# Patient Record
Sex: FEMALE | Race: OTHER | URBAN - METROPOLITAN AREA
[De-identification: names, ages, dates, MRNs, and addresses within clinical notes are randomized per-mention and may not be internally consistent; named-entity substitution may affect disease eponyms.]

---

## 2022-06-22 ENCOUNTER — OFFICE VISIT (OUTPATIENT)
Dept: URGENT CARE | Facility: CLINIC | Age: 57
End: 2022-06-22

## 2022-06-22 ENCOUNTER — HOSPITAL ENCOUNTER (OUTPATIENT)
Facility: MEDICAL CENTER | Age: 57
End: 2022-06-22
Attending: PHYSICIAN ASSISTANT

## 2022-06-22 VITALS
RESPIRATION RATE: 14 BRPM | SYSTOLIC BLOOD PRESSURE: 116 MMHG | HEIGHT: 67 IN | DIASTOLIC BLOOD PRESSURE: 84 MMHG | WEIGHT: 177 LBS | BODY MASS INDEX: 27.78 KG/M2 | OXYGEN SATURATION: 94 % | TEMPERATURE: 98.6 F | HEART RATE: 71 BPM

## 2022-06-22 DIAGNOSIS — N30.00 ACUTE CYSTITIS WITHOUT HEMATURIA: ICD-10-CM

## 2022-06-22 LAB
APPEARANCE UR: CLEAR
BILIRUB UR STRIP-MCNC: NEGATIVE MG/DL
COLOR UR AUTO: YELLOW
GLUCOSE UR STRIP.AUTO-MCNC: NEGATIVE MG/DL
KETONES UR STRIP.AUTO-MCNC: NEGATIVE MG/DL
LEUKOCYTE ESTERASE UR QL STRIP.AUTO: NORMAL
NITRITE UR QL STRIP.AUTO: NEGATIVE
PH UR STRIP.AUTO: 7 [PH] (ref 5–8)
PROT UR QL STRIP: NEGATIVE MG/DL
RBC UR QL AUTO: NORMAL
SP GR UR STRIP.AUTO: 1.01
UROBILINOGEN UR STRIP-MCNC: 2 MG/DL

## 2022-06-22 PROCEDURE — 99204 OFFICE O/P NEW MOD 45 MIN: CPT | Performed by: PHYSICIAN ASSISTANT

## 2022-06-22 PROCEDURE — 81002 URINALYSIS NONAUTO W/O SCOPE: CPT | Performed by: PHYSICIAN ASSISTANT

## 2022-06-22 PROCEDURE — 87086 URINE CULTURE/COLONY COUNT: CPT

## 2022-06-22 RX ORDER — WARFARIN SODIUM 5 MG/1
5 TABLET ORAL DAILY
COMMUNITY

## 2022-06-22 RX ORDER — NITROFURANTOIN 25; 75 MG/1; MG/1
100 CAPSULE ORAL 2 TIMES DAILY
Qty: 10 CAPSULE | Refills: 0 | Status: SHIPPED | OUTPATIENT
Start: 2022-06-22 | End: 2022-06-27

## 2022-06-22 RX ORDER — GABAPENTIN 300 MG/1
300 CAPSULE ORAL 3 TIMES DAILY
COMMUNITY

## 2022-06-22 RX ORDER — SERTRALINE HYDROCHLORIDE 100 MG/1
100 TABLET, FILM COATED ORAL DAILY
COMMUNITY

## 2022-06-22 RX ORDER — PHENAZOPYRIDINE HYDROCHLORIDE 200 MG/1
200 TABLET, FILM COATED ORAL 3 TIMES DAILY PRN
Qty: 6 TABLET | Refills: 0 | Status: SHIPPED | OUTPATIENT
Start: 2022-06-22

## 2022-06-23 DIAGNOSIS — N30.00 ACUTE CYSTITIS WITHOUT HEMATURIA: ICD-10-CM

## 2022-06-24 ASSESSMENT — ENCOUNTER SYMPTOMS
HEADACHES: 0
FLANK PAIN: 0
ABDOMINAL PAIN: 0
CHILLS: 0
FEVER: 0
VOMITING: 0
NAUSEA: 0
BACK PAIN: 1
COUGH: 0
SHORTNESS OF BREATH: 0
DIARRHEA: 0

## 2022-06-24 NOTE — PROGRESS NOTES
"Subjective     Katty oDss is a 57 y.o. female who presents with UTI (Started today, hurts in her back and side when she moves/)            Dysuria   This is a new problem. The current episode started today. The problem occurs every urination. The problem has been unchanged. The quality of the pain is described as burning. The pain is moderate. There has been no fever. Associated symptoms include frequency and urgency. Pertinent negatives include no chills, flank pain, hematuria, hesitancy, nausea or vomiting. Associated symptoms comments: Lower back pain . She has tried increased fluids for the symptoms. There is no history of recurrent UTIs.         No past medical history on file.    No past surgical history on file.    No family history on file.    No Known Allergies    Medications, Allergies, and current problem list reviewed today in Epic    Review of Systems   Constitutional: Negative for chills, fever and malaise/fatigue.   Respiratory: Negative for cough and shortness of breath.    Cardiovascular: Negative for chest pain and leg swelling.   Gastrointestinal: Negative for abdominal pain, diarrhea, nausea and vomiting.   Genitourinary: Positive for dysuria, frequency and urgency. Negative for flank pain, hematuria and hesitancy.   Musculoskeletal: Positive for back pain.   Neurological: Negative for headaches.     All other systems reviewed and are negative.              Objective     /84   Pulse 71   Temp 37 °C (98.6 °F) (Temporal)   Resp 14   Ht 1.702 m (5' 7\")   Wt 80.3 kg (177 lb)   SpO2 94%   BMI 27.72 kg/m²      Physical Exam  Constitutional:       General: She is not in acute distress.     Appearance: Normal appearance. She is not ill-appearing.   HENT:      Head: Normocephalic and atraumatic.   Eyes:      Conjunctiva/sclera: Conjunctivae normal.   Cardiovascular:      Rate and Rhythm: Normal rate and regular rhythm.   Pulmonary:      Effort: Pulmonary effort is normal. No respiratory " distress.      Breath sounds: No stridor. No wheezing.   Abdominal:      General: There is no distension.      Palpations: Abdomen is soft. There is no mass.      Tenderness: There is abdominal tenderness (mild suprapubic TTP ). There is no right CVA tenderness, left CVA tenderness, guarding or rebound.   Skin:     General: Skin is warm and dry.   Neurological:      General: No focal deficit present.      Mental Status: She is alert and oriented to person, place, and time.   Psychiatric:         Mood and Affect: Mood normal.         Behavior: Behavior normal.         Thought Content: Thought content normal.         Judgment: Judgment normal.                  Lab Results   Component Value Date/Time    POCCOLOR YELLOW 06/22/2022 06:23 PM    POCAPPEAR CLEAR 06/22/2022 06:23 PM    POCLEUKEST TRACE 06/22/2022 06:23 PM    POCNITRITE NEGATIVE 06/22/2022 06:23 PM    POCUROBILIGE 2.0 06/22/2022 06:23 PM    POCPROTEIN NEGATIVE 06/22/2022 06:23 PM    POCURPH 7.0 06/22/2022 06:23 PM    POCBLOOD TRACE INTACT 06/22/2022 06:23 PM    POCSPGRV 1.015 06/22/2022 06:23 PM    POCKETONES NEGATIVE 06/22/2022 06:23 PM    POCBILIRUBIN NEGATIVE 06/22/2022 06:23 PM    POCGLUCUA NEGATIVE 06/22/2022 06:23 PM                 Assessment & Plan        1. Acute cystitis without hematuria    - POCT Urinalysis  - URINE CULTURE(NEW); Future  - nitrofurantoin (MACROBID) 100 MG Cap; Take 1 Capsule by mouth 2 times a day for 5 days.  Dispense: 10 Capsule; Refill: 0  - phenazopyridine (PYRIDIUM) 200 MG Tab; Take 1 Tablet by mouth 3 times a day as needed for Moderate Pain.  Dispense: 6 Tablet; Refill: 0  Patient on Coumadin. Advised patient to watch for signs of bleeding due to co-comitant antibiotic use. Contact provider that monitors INR as she may warrant closer monitoring.    Differential diagnoses, Supportive care, and indications for immediate follow-up discussed with patient.   Pathogenesis of diagnosis discussed including typical length and natural  progression.   Instructed to return to clinic or nearest emergency department for any change in condition, further concerns, or worsening of symptoms.    The patient demonstrated a good understanding and agreed with the treatment plan.    Lachelle Fairchidl P.A.-C.

## 2022-06-25 LAB
BACTERIA UR CULT: NORMAL
SIGNIFICANT IND 70042: NORMAL
SITE SITE: NORMAL
SOURCE SOURCE: NORMAL